# Patient Record
Sex: MALE | URBAN - METROPOLITAN AREA
[De-identification: names, ages, dates, MRNs, and addresses within clinical notes are randomized per-mention and may not be internally consistent; named-entity substitution may affect disease eponyms.]

---

## 2023-11-01 ENCOUNTER — WEB ENCOUNTER (OUTPATIENT)
Dept: URBAN - METROPOLITAN AREA CLINIC 27 | Facility: CLINIC | Age: 58
End: 2023-11-01

## 2023-11-02 ENCOUNTER — DASHBOARD ENCOUNTERS (OUTPATIENT)
Age: 58
End: 2023-11-02

## 2023-11-02 ENCOUNTER — OFFICE VISIT (OUTPATIENT)
Dept: URBAN - METROPOLITAN AREA CLINIC 27 | Facility: CLINIC | Age: 58
End: 2023-11-02
Payer: COMMERCIAL

## 2023-11-02 VITALS
SYSTOLIC BLOOD PRESSURE: 145 MMHG | WEIGHT: 234 LBS | RESPIRATION RATE: 17 BRPM | DIASTOLIC BLOOD PRESSURE: 99 MMHG | HEART RATE: 68 BPM | BODY MASS INDEX: 34.66 KG/M2 | HEIGHT: 69 IN

## 2023-11-02 DIAGNOSIS — F41.9 ANXIETY: ICD-10-CM

## 2023-11-02 DIAGNOSIS — R19.7 DIARRHEA: ICD-10-CM

## 2023-11-02 DIAGNOSIS — E66.8 OTHER OBESITY: ICD-10-CM

## 2023-11-02 DIAGNOSIS — Z12.11 SCREEN FOR COLON CANCER: ICD-10-CM

## 2023-11-02 DIAGNOSIS — R10.9 LEFT SIDED ABDOMINAL PAIN: ICD-10-CM

## 2023-11-02 DIAGNOSIS — R11.0 NAUSEA: ICD-10-CM

## 2023-11-02 DIAGNOSIS — K21.9 GERD: ICD-10-CM

## 2023-11-02 PROCEDURE — 99204 OFFICE O/P NEW MOD 45 MIN: CPT | Performed by: INTERNAL MEDICINE

## 2023-11-02 NOTE — HPI-TODAY'S VISIT:
Patient here self-referred for evaluation of multiple GI symptoms that have been present for the past year.  He has intermittent diarrhea at least once or twice a week.  It is not nocturnal or bloody.  He denies any specific food sensitivities but has not kept a food diary.  He has occasional nausea approximately twice a month for which PRN Pepcid is helpful.  He notes that the nausea usually occurs in the morning.  He also has had intermittent left-sided abdominal  discomfort ("a dull ache") over the past year.  It is not nocturnal.  It is often worse when he bends over to the left side.  It is deep and it does not radiate.  It is worse when he exercises, and better when he sits or walks.  He does not take any medication for this.  He has infrequent reflux symptoms.  He does not take any medication for this aside from Pepcid.  He is not adherent to an antireflux regimen.  He has not had any recent labs or imaging.  He has not had a prior upper or lower endoscopy but apparently is scheduled for a colonoscopy this upcoming week.  There is no family history of colon cancer or polyps.  He does have mild/moderate anxiety.

## 2023-11-02 NOTE — PHYSICAL EXAM GASTROINTESTINAL
Abdomen is soft, mild periumbilical TTP (L>R), nondistended, no guarding or rigidity, no masses palpable, normal bowel sounds; mild/moderate obesity

## 2023-11-03 ENCOUNTER — LAB OUTSIDE AN ENCOUNTER (OUTPATIENT)
Dept: URBAN - METROPOLITAN AREA CLINIC 27 | Facility: CLINIC | Age: 58
End: 2023-11-03

## 2023-11-15 ENCOUNTER — TELEPHONE ENCOUNTER (OUTPATIENT)
Dept: URBAN - METROPOLITAN AREA CLINIC 27 | Facility: CLINIC | Age: 58
End: 2023-11-15

## 2023-11-16 ENCOUNTER — TELEPHONE ENCOUNTER (OUTPATIENT)
Dept: URBAN - METROPOLITAN AREA CLINIC 27 | Facility: CLINIC | Age: 58
End: 2023-11-16

## 2023-11-20 ENCOUNTER — TELEPHONE ENCOUNTER (OUTPATIENT)
Dept: URBAN - METROPOLITAN AREA CLINIC 27 | Facility: CLINIC | Age: 58
End: 2023-11-20

## 2023-12-14 ENCOUNTER — OFFICE VISIT (OUTPATIENT)
Dept: URBAN - METROPOLITAN AREA CLINIC 27 | Facility: CLINIC | Age: 58
End: 2023-12-14

## 2024-03-19 ENCOUNTER — APPOINTMENT (RX ONLY)
Dept: URBAN - METROPOLITAN AREA CLINIC 12 | Facility: CLINIC | Age: 59
Setting detail: DERMATOLOGY
End: 2024-03-19

## 2024-03-19 DIAGNOSIS — Z41.1 ENCOUNTER FOR COSMETIC SURGERY: ICD-10-CM

## 2024-03-19 PROCEDURE — ? PATIENT SPECIFIC COUNSELING

## 2024-03-19 PROCEDURE — ? CONSULTATION - AGING FACE

## 2024-03-19 NOTE — PROCEDURE: CONSULTATION - AGING FACE
Consultation Charge $ (Use Numbers Only, No Text Please.): 125.72
Detail Level: Detailed
Other Plan: Lower face and neck lift
Send Procedure Quote As Charge: No

## 2024-03-19 NOTE — PROCEDURE: PATIENT SPECIFIC COUNSELING
Detail Level: Simple
Other (Free Text): Patient is unhappy with her lower face and neck and would like to discuss her options with . \\nPatient lost 15 lb and would like to lose another 10 pounds with diet and exercise.\\Jessica examined the patient and voiced that he is a good candidate for lower face and neck lift which will get the patient the best and long lasting results. The incision sites were explained to the patient. Another more invasive option is liposuction and skin tite which can be done here in the office.\\Jessica voiced that patient does not have any sun damage, patient has mild skin laxity and volume loss. Patient will spend the night after the procedure, patient needs to stay low for 7-10 days . Risks of the procedure include bleeding, nerve injury, temporary face muscle weakness. Patient verbalized understanding. Samira stepped in to give a quote.